# Patient Record
Sex: MALE | Employment: STUDENT | URBAN - METROPOLITAN AREA
[De-identification: names, ages, dates, MRNs, and addresses within clinical notes are randomized per-mention and may not be internally consistent; named-entity substitution may affect disease eponyms.]

---

## 2023-07-27 LAB
FUNGAL CULTURE/SMEAR: ABNORMAL
FUNGAL SMEAR: ABNORMAL

## 2023-07-30 LAB
GRAM STAIN: ABNORMAL
TISSUE/WOUND CULTURE/SMEAR: ABNORMAL
TISSUE/WOUND CULTURE/SMEAR: ABNORMAL

## 2023-11-20 ENCOUNTER — OFFICE VISIT (OUTPATIENT)
Dept: DERMATOLOGY | Facility: CLINIC | Age: 23
End: 2023-11-20
Payer: COMMERCIAL

## 2023-11-20 DIAGNOSIS — L30.9 DERMATITIS: Primary | ICD-10-CM

## 2023-11-20 PROCEDURE — 99214 OFFICE O/P EST MOD 30 MIN: CPT | Performed by: NURSE PRACTITIONER

## 2023-11-20 RX ORDER — HYDROCORTISONE 25 MG/G
CREAM TOPICAL 2 TIMES DAILY
Qty: 20 G | Refills: 0 | Status: SHIPPED | OUTPATIENT
Start: 2023-11-20 | End: 2023-11-27 | Stop reason: SDUPTHER

## 2023-11-20 NOTE — PROGRESS NOTES
Subjective     Ozzie Wyatt is a 23 y.o. male who presents for the following: Rash (Established patient of Dr. Artis last seen well prescribed Accutane patient completed therapy in today for a new rash/hives that begun x1 week patient states off and on swelling redness flaking.  Patient has been using CeraVe wash and over-the-counter moisturizing cream with no relief.).     Review of Systems:  No other skin or systemic complaints other than what is documented elsewhere in the note.    The following portions of the chart were reviewed this encounter and updated as appropriate:  Allergies       Skin Cancer History  No skin cancer on file.    Specialty Problems    None    Past Medical History:  Ozzie Wyatt  has a past medical history of Other specified health status.    Past Surgical History:  Ozzie Wyatt  has a past surgical history that includes Other surgical history (07/01/2021).    Family History:  Patient family history is not on file.    Social History:  Ozzie Wyatt  has no history on file for tobacco use, alcohol use, and drug use.    Allergies:  Patient has no known allergies.    Current Medications / CAM's:    Current Outpatient Medications:     hydrocortisone 2.5 % cream, Apply topically 2 times a day for 14 days., Disp: 20 g, Rfl: 0     Objective   Well appearing patient in no apparent distress; mood and affect are within normal limits.    A full examination was performed including scalp, head, eyes, ears, nose, lips, neck, chest, axillae, abdomen, back, buttocks, bilateral upper extremities, bilateral lower extremities, hands, feet, fingers, toes, fingernails, and toenails. All findings within normal limits unless otherwise noted below.    Assessment/Plan   1. Dermatitis  Head - Anterior (Face)  Eczematous rash to periorobital area and lateral cheeks present x1 week.      - Some people have specific things that trigger episodes of itchiness and rashes, while others cannot find a trigger.  Triggers may even change over time. Common triggers include: bathing for more than 10 minutes with hot water, low humidity, cigarette or wood smoke, stress, sweat, rubbing and overheating of skin, and exposure to certain products such as wool, harsh soaps, fragrance, bubble baths, and laundry soap.    GENERAL CARE GUIDELINES       Keep finger nails cut short to reduce trauma caused by scratching.          Application of a moisturizing cream or ointment a minimum of twice daily.  Ointments are best, creams are good too. Lotions are better for oily areas.  Avoid fragrances and herbal additives.           Limit baths or showers to 5-10 minutes and use warm water once daily. Hot water may relieve the itch briefly, but causes more dryness and can make itching worse. Baths or showers daily should be followed immediately with a thick moisturizer to ``seal´´ in the moisture --within 3 minutes while skin is still damp.         Cleansers (soap) - Do not use harsh or antibacterial soaps. They cause more dryness and itching, and may be used to prevent flares.          Itching (Itch Scratch cycle) - Scratching relieves the itch feeling briefly, but continued scratching causes skin soreness, which causes more itching. The itch scratch cycle will continue as you continue to scratch your skin.  For severe itching, Sarna Anti-Itch Lotion or Eucerin Skin Calming Relief Treatment (menthol) can be used as needed. Keep them in the refrigerator for even greater itch relief.  The use of ice packs can also be used to reduce the feeling of itching    PLAN:  Start hydrocortisone 2.5% cream twice daily until clear    Related Medications  hydrocortisone 2.5 % cream  Apply topically 2 times a day for 14 days.

## 2023-11-27 ENCOUNTER — TELEPHONE (OUTPATIENT)
Dept: DERMATOLOGY | Facility: CLINIC | Age: 23
End: 2023-11-27
Payer: COMMERCIAL

## 2023-11-27 DIAGNOSIS — L30.9 DERMATITIS: ICD-10-CM

## 2023-11-27 RX ORDER — HYDROCORTISONE 25 MG/G
CREAM TOPICAL 2 TIMES DAILY
Qty: 20 G | Refills: 0 | Status: SHIPPED | OUTPATIENT
Start: 2023-11-27 | End: 2023-12-11

## 2023-11-27 NOTE — TELEPHONE ENCOUNTER
Patient called and states that he had trouble filling his previous prescription when he was in [facility] but is now in [place]. Pt has given me a new pharmacy that is now entered into his chart for [place] can you please resubmit his prescription to new pharmacy.

## 2024-08-28 ENCOUNTER — APPOINTMENT (OUTPATIENT)
Dept: DERMATOLOGY | Facility: CLINIC | Age: 24
End: 2024-08-28
Payer: COMMERCIAL

## 2024-08-28 DIAGNOSIS — L73.9 FOLLICULITIS: ICD-10-CM

## 2024-08-28 DIAGNOSIS — L70.0 ACNE VULGARIS: Primary | ICD-10-CM

## 2024-08-28 PROCEDURE — 99214 OFFICE O/P EST MOD 30 MIN: CPT | Performed by: STUDENT IN AN ORGANIZED HEALTH CARE EDUCATION/TRAINING PROGRAM

## 2024-08-28 RX ORDER — CLINDAMYCIN PHOSPHATE 10 UG/ML
LOTION TOPICAL DAILY
Qty: 60 ML | Refills: 11 | Status: SHIPPED | OUTPATIENT
Start: 2024-08-28 | End: 2025-08-28

## 2024-08-28 RX ORDER — KETOCONAZOLE 20 MG/G
CREAM TOPICAL 2 TIMES DAILY
Qty: 30 G | Refills: 11 | Status: SHIPPED | OUTPATIENT
Start: 2024-08-28

## 2024-08-28 NOTE — PROGRESS NOTES
Subjective     Ozzie Wyatt is a 23 y.o. male who presents for the following: No chief complaint on file..     Review of Systems:  No other skin or systemic complaints other than what is documented elsewhere in the note.    The following portions of the chart were reviewed this encounter and updated as appropriate:          Skin Cancer History  No skin cancer on file.      Specialty Problems    None       Objective   Well appearing patient in no apparent distress; mood and affect are within normal limits.    A focused skin examination was performed. All findings within normal limits unless otherwise noted below.    Assessment/Plan   1. Acne vulgaris  Head - Anterior (Face)  Few scattered papules and pustules on the cheeks and crusted papule of left upper lip    - History of acne vs gram negative folliculitis that did not respond completely to isotretinoin but improvement noted with mupirocin and ketoconazole cream. Previous skin culture grew MSSA. 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  - Notes break out in the last 2 weeks, particularly on the cheeks and flaky bumps around the ears, also noted lesions on his lips he thinks may have been cold sores  - Unclear etiology at this time but suspect possible bacterial/yeast folliculitis given history  - Start clindamycin lotion and ketoconazole cream, can use both BID or one in the morning and one in the evening  - Reviewed gentle skin care and thick moisturizing cream  - Follow up in one month in person for evaluation    ketoconazole (NIZOral) 2 % cream - Head - Anterior (Face)  Apply topically 2 times a day.    clindamycin (Cleocin T) 1 % lotion - Head - Anterior (Face)  Apply topically once daily. 60g    Related Procedures  Follow Up In Dermatology - Established Patient    2. Folliculitis      Christelle Gonsales MD  I was present during all key portions of visit including history, exam, discussion/plan and/or procedures and directly  supervised our resident during all portions of the visit, follow up care, medications and more    Jose Artis MD

## 2024-09-20 ENCOUNTER — APPOINTMENT (OUTPATIENT)
Dept: DERMATOLOGY | Facility: CLINIC | Age: 24
End: 2024-09-20
Payer: COMMERCIAL

## 2024-09-20 DIAGNOSIS — L70.0 ACNE VULGARIS: ICD-10-CM

## 2024-09-20 PROCEDURE — 99213 OFFICE O/P EST LOW 20 MIN: CPT | Performed by: STUDENT IN AN ORGANIZED HEALTH CARE EDUCATION/TRAINING PROGRAM

## 2024-09-20 RX ORDER — BENZOYL PEROXIDE 50 MG/ML
LIQUID TOPICAL
Qty: 240 G | Refills: 3 | Status: SHIPPED | OUTPATIENT
Start: 2024-09-20

## 2024-09-20 ASSESSMENT — DERMATOLOGY QUALITY OF LIFE (QOL) ASSESSMENT
DATE THE QUALITY-OF-LIFE ASSESSMENT WAS COMPLETED: 67103
WHAT SINGLE SKIN CONDITION LISTED BELOW IS THE PATIENT ANSWERING THE QUALITY-OF-LIFE ASSESSMENT QUESTIONS ABOUT: ACNE
ARE THERE EXCLUSIONS OR EXCEPTIONS FOR THE QUALITY OF LIFE ASSESSMENT: NO
RATE HOW BOTHERED YOU ARE BY SYMPTOMS OF YOUR SKIN PROBLEM (EG, ITCHING, STINGING BURNING, HURTING OR SKIN IRRITATION): 0 - NEVER BOTHERED
RATE HOW EMOTIONALLY BOTHERED YOU ARE BY YOUR SKIN PROBLEM (FOR EXAMPLE, WORRY, EMBARRASSMENT, FRUSTRATION): 0 - NEVER BOTHERED
RATE HOW BOTHERED YOU ARE BY EFFECTS OF YOUR SKIN PROBLEMS ON YOUR ACTIVITIES (EG, GOING OUT, ACCOMPLISHING WHAT YOU WANT, WORK ACTIVITIES OR YOUR RELATIONSHIPS WITH OTHERS): 0 - NEVER BOTHERED

## 2024-09-20 ASSESSMENT — PATIENT GLOBAL ASSESSMENT (PGA): PATIENT GLOBAL ASSESSMENT: PATIENT GLOBAL ASSESSMENT:  2 - MILD

## 2024-09-20 ASSESSMENT — DERMATOLOGY PATIENT ASSESSMENT: DO YOU HAVE ANY NEW OR CHANGING LESIONS: NO

## 2024-09-20 ASSESSMENT — ITCH NUMERIC RATING SCALE: HOW SEVERE IS YOUR ITCHING?: 0

## 2024-09-20 NOTE — PATIENT INSTRUCTIONS
Continue ketoconazole cream even twice daily on right sideburn area (also on left)  Continue clindamycin lotion daily on bump prone areas  Once a week use benzoyl peroxide wash to bump prone areas on face    If the area on the sideburns is not going away, message  me and ill send you a topical steroid to use for a few weeks

## 2024-09-20 NOTE — PROGRESS NOTES
Subjective     Ozzie Wyatt is a 23 y.o. male who presents for the following: Acne (Face, back).     Review of Systems:  No other skin or systemic complaints other than what is documented elsewhere in the note.    The following portions of the chart were reviewed this encounter and updated as appropriate:          Skin Cancer History  No skin cancer on file.      Specialty Problems    None       Objective   Well appearing patient in no apparent distress; mood and affect are within normal limits.    A focused skin examination was performed. All findings within normal limits unless otherwise noted below.    Assessment/Plan   1. Acne vulgaris  Head - Anterior (Face)  Improved  Face is clear other than mildly lichenifiedpatch on right sideburn >left  KOHnegative on right sideburn    - History of acne vs gram negative folliculitis that did not respond completely to isotretinoin but improvement noted with mupirocin and ketoconazole cream. Previous skin culture grew MSSA. 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    Had 80% improvement since lastvisit with ketoconazole cream andclindamycinlotion  Still with mildlichenifications on sideburns  LSC maybe? Maybethere wasmild yeast overgrowth there-koh negative etoday    Continue current reigmen  Adding BPO wash weeklyas well    Related Procedures  Follow Up In Dermatology - Established Patient    Related Medications  ketoconazole (NIZOral) 2 % cream  Apply topically 2 times a day.    clindamycin (Cleocin T) 1 % lotion  Apply topically once daily. 60g    benzoyl peroxide 5 % external wash  Wash face once weekly

## 2024-12-09 ENCOUNTER — APPOINTMENT (OUTPATIENT)
Dept: DERMATOLOGY | Facility: CLINIC | Age: 24
End: 2024-12-09
Payer: COMMERCIAL

## 2024-12-09 DIAGNOSIS — L28.0 LICHEN SIMPLEX CHRONICUS: ICD-10-CM

## 2024-12-09 DIAGNOSIS — L30.9 DERMATITIS: ICD-10-CM

## 2024-12-09 DIAGNOSIS — L20.9 ATOPIC DERMATITIS, UNSPECIFIED TYPE: Primary | ICD-10-CM

## 2024-12-09 DIAGNOSIS — L70.0 ACNE VULGARIS: ICD-10-CM

## 2024-12-09 PROCEDURE — 99214 OFFICE O/P EST MOD 30 MIN: CPT | Performed by: STUDENT IN AN ORGANIZED HEALTH CARE EDUCATION/TRAINING PROGRAM

## 2024-12-09 RX ORDER — TRIAMCINOLONE ACETONIDE 1 MG/G
CREAM TOPICAL
Qty: 80 G | Refills: 3 | Status: SHIPPED | OUTPATIENT
Start: 2024-12-09

## 2024-12-09 RX ORDER — KETOCONAZOLE 20 MG/G
CREAM TOPICAL 2 TIMES DAILY
Qty: 30 G | Refills: 11 | Status: SHIPPED | OUTPATIENT
Start: 2024-12-09

## 2024-12-09 RX ORDER — CLINDAMYCIN PHOSPHATE 10 UG/ML
LOTION TOPICAL DAILY
Qty: 60 ML | Refills: 11 | Status: SHIPPED | OUTPATIENT
Start: 2024-12-09 | End: 2025-12-09

## 2024-12-09 RX ORDER — BENZOYL PEROXIDE 50 MG/ML
LIQUID TOPICAL
Qty: 240 G | Refills: 3 | Status: SHIPPED | OUTPATIENT
Start: 2024-12-09

## 2024-12-09 NOTE — PROGRESS NOTES
Subjective     Ozzie Wyatt is a 24 y.o. male who presents for the following: Rash (Follow up around chin, it's been patchy dry, flakes and is itchy sometimes) and Med Refill (Needs refill son benzoyl peroxide, clindamycin lotion and Ketoconazole. PENDED and SENT to Dr. Artis).     Review of Systems:  No other skin or systemic complaints other than what is documented elsewhere in the note.    The following portions of the chart were reviewed this encounter and updated as appropriate:          Skin Cancer History  No skin cancer on file.      Specialty Problems    None       Objective   Well appearing patient in no apparent distress; mood and affect are within normal limits.    A focused skin examination was performed. All findings within normal limits unless otherwise noted below.    Assessment/Plan     Lichen simplex chronicus  Right Anterior Mandible  Reddish lichenified plaque    - triamcinolone 0.1% cream 2 weeks on 2 weeks off (with time, the mid range steroid will thin down the lichenified plaques (previously KOH was done and was negative)  He is aware of long term safety issues so he will not use regularly and rather do 2 weeks on 2 weeks off for 3-4 month or so    2  Dermatitis  Likely combination of seborrheic dermatitis and acne  History of severe gram negative folliculitis, clear s/p isotretinoin    Head - Anterior (Face)  Waxing and waning reddish patches, follicular papules and acneiform papules on cheek    Related Medications  Continue alternating hydrocortisone 2.5 % cream if needed for flaring with clindamycin 1% lotion and ketconaozle 2% cream daily to trouble areas (areas where you keep getting new bumps or new red flaky areas)  Apply topically 2 times a day for 14 days.